# Patient Record
Sex: MALE | Race: OTHER | Employment: FULL TIME | URBAN - METROPOLITAN AREA
[De-identification: names, ages, dates, MRNs, and addresses within clinical notes are randomized per-mention and may not be internally consistent; named-entity substitution may affect disease eponyms.]

---

## 2017-10-31 PROBLEM — Z00.00 HEALTH CARE MAINTENANCE: Status: ACTIVE | Noted: 2017-10-31

## 2017-10-31 PROBLEM — Z13.29 ENCOUNTER FOR SCREENING FOR ENDOCRINE DISORDER: Status: ACTIVE | Noted: 2017-10-31

## 2017-10-31 PROBLEM — Z91.89: Status: ACTIVE | Noted: 2017-10-31

## 2017-10-31 PROBLEM — Z12.5 PROSTATE CANCER SCREENING: Status: ACTIVE | Noted: 2017-10-31

## 2017-10-31 PROBLEM — Z12.11 COLON CANCER SCREENING: Status: ACTIVE | Noted: 2017-10-31

## 2017-10-31 PROBLEM — Z91.89 AT RISK FOR OBSTRUCTIVE SLEEP APNEA: Status: ACTIVE | Noted: 2017-10-31

## 2017-10-31 PROBLEM — Z12.71 SCREENING FOR TESTICULAR CANCER: Status: ACTIVE | Noted: 2017-10-31

## 2017-10-31 PROBLEM — K43.9 VENTRAL HERNIA WITHOUT OBSTRUCTION OR GANGRENE: Status: ACTIVE | Noted: 2017-10-31

## 2017-10-31 PROBLEM — F41.9 ANXIETY: Status: ACTIVE | Noted: 2017-10-31

## 2019-01-21 PROBLEM — R51.9 HEADACHE DISORDER: Status: ACTIVE | Noted: 2019-01-21

## 2019-01-21 PROBLEM — I10 ESSENTIAL HYPERTENSION: Status: ACTIVE | Noted: 2019-01-21

## 2019-08-21 PROBLEM — E11.65 TYPE 2 DIABETES MELLITUS WITH HYPERGLYCEMIA, WITHOUT LONG-TERM CURRENT USE OF INSULIN (HCC): Status: ACTIVE | Noted: 2019-08-21

## 2019-08-21 PROBLEM — R74.01 TRANSAMINASEMIA: Status: ACTIVE | Noted: 2019-08-21

## 2019-08-21 PROBLEM — R77.8 ELEVATED TOTAL PROTEIN: Status: ACTIVE | Noted: 2019-08-21

## 2019-08-21 PROBLEM — Z13.29 ENCOUNTER FOR SCREENING FOR ENDOCRINE DISORDER: Status: RESOLVED | Noted: 2017-10-31 | Resolved: 2019-08-21

## 2019-08-21 PROBLEM — Z91.89: Status: RESOLVED | Noted: 2017-10-31 | Resolved: 2019-08-21

## 2019-08-21 PROCEDURE — 86335 IMMUNFIX E-PHORSIS/URINE/CSF: CPT | Performed by: INTERNAL MEDICINE

## 2019-08-21 PROCEDURE — 86334 IMMUNOFIX E-PHORESIS SERUM: CPT | Performed by: INTERNAL MEDICINE

## 2019-08-21 PROCEDURE — 80074 ACUTE HEPATITIS PANEL: CPT | Performed by: INTERNAL MEDICINE

## 2019-08-21 PROCEDURE — 84165 PROTEIN E-PHORESIS SERUM: CPT | Performed by: INTERNAL MEDICINE

## 2019-08-21 PROCEDURE — 84156 ASSAY OF PROTEIN URINE: CPT | Performed by: INTERNAL MEDICINE

## 2019-08-21 PROCEDURE — 83883 ASSAY NEPHELOMETRY NOT SPEC: CPT | Performed by: INTERNAL MEDICINE

## 2019-08-30 PROBLEM — R74.8 ALKALINE PHOSPHATASE ELEVATION: Status: ACTIVE | Noted: 2019-08-30

## 2019-08-30 PROBLEM — R09.82 PND (POST-NASAL DRIP): Status: ACTIVE | Noted: 2019-08-30

## 2019-08-30 PROCEDURE — 84080 ASSAY ALKALINE PHOSPHATASES: CPT | Performed by: INTERNAL MEDICINE

## 2019-08-30 PROCEDURE — 83516 IMMUNOASSAY NONANTIBODY: CPT | Performed by: INTERNAL MEDICINE

## 2019-08-30 PROCEDURE — 82390 ASSAY OF CERULOPLASMIN: CPT | Performed by: INTERNAL MEDICINE

## 2019-08-30 PROCEDURE — 82103 ALPHA-1-ANTITRYPSIN TOTAL: CPT | Performed by: INTERNAL MEDICINE

## 2019-08-30 PROCEDURE — 84075 ASSAY ALKALINE PHOSPHATASE: CPT | Performed by: INTERNAL MEDICINE

## 2019-09-17 PROBLEM — Z01.818 PREOPERATIVE EXAMINATION: Status: ACTIVE | Noted: 2019-09-17

## 2019-09-17 PROBLEM — R74.8 ALKALINE PHOSPHATASE ELEVATION: Status: RESOLVED | Noted: 2019-08-30 | Resolved: 2019-09-17

## 2019-09-17 PROBLEM — K76.0 HEPATIC STEATOSIS: Status: ACTIVE | Noted: 2019-09-17

## 2019-09-17 PROBLEM — R77.8 ELEVATED TOTAL PROTEIN: Status: RESOLVED | Noted: 2019-08-21 | Resolved: 2019-09-17

## 2020-01-24 PROBLEM — Z01.818 PREOPERATIVE EXAMINATION: Status: RESOLVED | Noted: 2019-09-17 | Resolved: 2020-01-24

## 2020-01-24 PROBLEM — Z98.890 POSTOPERATIVE SCAR: Status: ACTIVE | Noted: 2020-01-24

## 2020-01-24 PROBLEM — L90.5 POSTOPERATIVE SCAR: Status: ACTIVE | Noted: 2020-01-24

## 2020-01-24 PROBLEM — E11.9 TYPE 2 DIABETES MELLITUS WITHOUT COMPLICATION, WITHOUT LONG-TERM CURRENT USE OF INSULIN (HCC): Status: ACTIVE | Noted: 2019-08-21

## 2020-01-24 PROBLEM — Z12.71 SCREENING FOR TESTICULAR CANCER: Status: RESOLVED | Noted: 2017-10-31 | Resolved: 2020-01-24

## 2020-01-24 PROBLEM — H54.40 BLINDNESS OF LEFT EYE: Status: ACTIVE | Noted: 2020-01-24

## 2020-01-24 PROBLEM — F41.0 PANIC ATTACK: Status: ACTIVE | Noted: 2020-01-24

## 2020-01-24 PROBLEM — F41.0 PANIC ATTACK: Status: RESOLVED | Noted: 2020-01-24 | Resolved: 2020-01-24

## 2020-01-24 PROBLEM — Z00.00 HEALTH CARE MAINTENANCE: Status: RESOLVED | Noted: 2017-10-31 | Resolved: 2020-01-24

## 2020-01-24 PROBLEM — E11.9 DIABETES MELLITUS (HCC): Status: ACTIVE | Noted: 2020-01-24

## 2020-03-27 PROBLEM — Z87.19 H/O: UGI BLEED: Status: ACTIVE | Noted: 2020-03-27

## 2020-03-27 PROBLEM — F10.10 ALCOHOL ABUSE: Status: ACTIVE | Noted: 2020-03-27

## 2024-12-27 PROCEDURE — 99285 EMERGENCY DEPT VISIT HI MDM: CPT

## 2024-12-27 PROCEDURE — 99284 EMERGENCY DEPT VISIT MOD MDM: CPT

## 2024-12-28 ENCOUNTER — HOSPITAL ENCOUNTER (EMERGENCY)
Age: 39
Discharge: HOME | End: 2024-12-28
Attending: EMERGENCY MEDICINE
Payer: COMMERCIAL

## 2024-12-28 VITALS
WEIGHT: 220 LBS | TEMPERATURE: 98 F | RESPIRATION RATE: 17 BRPM | HEIGHT: 72 IN | DIASTOLIC BLOOD PRESSURE: 85 MMHG | BODY MASS INDEX: 29.8 KG/M2 | SYSTOLIC BLOOD PRESSURE: 113 MMHG | HEART RATE: 83 BPM | OXYGEN SATURATION: 99 %

## 2024-12-28 DIAGNOSIS — R11.2 NAUSEA AND VOMITING, UNSPECIFIED VOMITING TYPE: ICD-10-CM

## 2024-12-28 DIAGNOSIS — R39.89 URINE DISCOLORATION: Primary | ICD-10-CM

## 2024-12-28 DIAGNOSIS — B34.9 VIRAL SYNDROME: ICD-10-CM

## 2024-12-28 LAB
BILIRUB UR QL CFM: NEGATIVE
CLARITY UR REFRACT.AUTO: CLEAR
COLOR UR AUTO: YELLOW
GLUCOSE UR STRIP.AUTO-MCNC: 250 MG/DL
KETONES UR STRIP.AUTO-MCNC: 40 MG/DL
LEUKOCYTE ESTERASE UR QL STRIP.AUTO: NEGATIVE
NITRITE UR QL STRIP.AUTO: NEGATIVE
PH UR STRIP.AUTO: 7.5 [PH] (ref 5–8)
RBC UR QL AUTO: NEGATIVE
SP GR UR STRIP.AUTO: 1.01 (ref 1–1.03)
UROBILINOGEN UR STRIP.AUTO-MCNC: 4 MG/DL

## 2024-12-28 PROCEDURE — 81001 URINALYSIS AUTO W/SCOPE: CPT

## 2024-12-28 PROCEDURE — 81001 URINALYSIS AUTO W/SCOPE: CPT | Performed by: EMERGENCY MEDICINE

## 2024-12-28 PROCEDURE — 81015 MICROSCOPIC EXAM OF URINE: CPT

## 2024-12-28 PROCEDURE — S0119 ONDANSETRON 4 MG: HCPCS | Performed by: EMERGENCY MEDICINE

## 2024-12-28 RX ORDER — ONDANSETRON 4 MG/1
4 TABLET, ORALLY DISINTEGRATING ORAL ONCE
Status: COMPLETED | OUTPATIENT
Start: 2024-12-28 | End: 2024-12-28

## 2024-12-28 RX ORDER — ONDANSETRON 4 MG/1
4 TABLET, ORALLY DISINTEGRATING ORAL EVERY 4 HOURS PRN
Qty: 10 TABLET | Refills: 0 | Status: SHIPPED | OUTPATIENT
Start: 2024-12-28 | End: 2025-01-04

## 2024-12-28 NOTE — ED INITIAL ASSESSMENT (HPI)
Pt states for a few days his urine has been the color of cranberry juice. Feels fatigued. Vomited x 1.

## 2024-12-28 NOTE — ED PROVIDER NOTES
Patient Seen in: [facility] In [place]      History     Chief Complaint   Patient presents with    Urinary Symptoms     Stated Complaint: urinary sx    Subjective:   HPI      39-year-old male presents to the ER with urine discoloration.  The patient states he is notices for the past couple days he feels fatigued.  He did have an episode of vomiting.  He said he had been drinking alcohol recently.  She has been trying to eat and drink fluids but has had nausea and felt slightly ill to his intake has been decreased.  No fevers.  No sick contacts.    Objective:     Past Medical History:    Diabetes (HCC)    Essential hypertension              Past Surgical History:   Procedure Laterality Date    Hernia surgery  10/01/2019    Ventral incisional hernia repair - Karla Wyatt    Other surgical history  2008    Exploratory abd surgery                Social History     Socioeconomic History    Marital status: Single   Tobacco Use    Smoking status: Never    Smokeless tobacco: Never   Substance and Sexual Activity    Alcohol use: Yes     Comment: social     Drug use: No                  Physical Exam     ED Triage Vitals [12/28/24 0006]   BP (!) 128/91   Pulse 91   Resp 16   Temp 98 °F (36.7 °C)   Temp src    SpO2 96 %   O2 Device None (Room air)       Current Vitals:   Vital Signs  BP: 113/85  Pulse: 83  Resp: 17  Temp: 98 °F (36.7 °C)    Oxygen Therapy  SpO2: 99 %  O2 Device: None (Room air)        Physical Exam  General: This a pleasant nontoxic appearing patient in no apparent distress alert and oriented ×3  HEENT: Pupils are equal reactive to light.  Extra ocular motions are intact.  No scleral icterus or conjunctival pallor  Lungs: Clear to auscultation bilaterally.  No wheezes, rhonchi, or rales appreciated.  No accessory muscle use noted for breathing.  Cardiac: Regular rate and rhythm.  Normal S1 and 2 without murmurs or ectopy appreciated  Abdomen: Soft on examination without tenderness  to deep palpation or to percussion.  No masses appreciated.  Bowel sounds are normoactive.  No CVA tenderness.  Extremities: Moving all 4 without difficulty.  ED Course     Labs Reviewed   URINALYSIS, ROUTINE - Abnormal; Notable for the following components:       Result Value    Glucose Urine 250 (*)     Ketones Urine 40 (*)     Protein Urine 30 mg/dL (*)     Urobilinogen Urine 4.0 (*)     All other components within normal limits   UA MICROSCOPIC ONLY, URINE - Abnormal; Notable for the following components:    Bacteria Urine 2+ (*)     All other components within normal limits   ICTOTEST - Normal                   MDM    Differential diagnosis includes was not limited to urinary tract infection, dehydration, hyperbilirubinemia.  Patient's urinalysis had negative ictotest.  +2 bacteria but only 1-5 white blood cells.  Urine showed some ketones and some glucose protein was in the urine and some urobilinogen.  Negative nitrite and leukocyte esterase.  He received Zofran for nausea control.  He was able to tolerate some fluids.  He will need follow-up for his discolored urine.  There appears to be some urobilinogen but ictotest was negative.  This could be lysis of cells this could be related to some liver disease but he otherwise appears nontoxic.  Could be viral syndrome.  Encouraged to follow-up as an outpatient.  Continue workup with his primary care physician.  Return if symptoms progress  Note to Patient  The 21st Century Cures Act makes medical notes like these available to patients in the interest of transparency. However, be advised this is a medical document and is intended as [id number] communication; it is written in medical language and may appear blunt, direct, or contain abbreviations or verbiage that are unfamiliar. Medical documents are intended to carry relevant information, facts as evident, and the clinical opinion of the practitioner.  Patient was evaluated and a screening exam was performed.    As a treating physician attending to the patient, I determined, within reasonable clinical confidence and prior to discharge, that an emergency medical condition was not or was no longer present.  There was no indication for further evaluation, treatment or admission on an emergency basis.  Comprehensive verbal and written discharge and follow-up instructions were provided to help prevent relapse or worsening.  Patient was instructed to follow-up with their primary care provider for further evaluation and treatment, but to return immediately to the ER for worsening, concerning, new, changing or persisting symptoms.  I discussed the case with the patient and they had no questions, complaints, or concerns.  Patient felt comfortable going home.  ^^Please note that this report has been produced using speech recognition software and may contain errors related to that system including, but not limited to, errors in grammar, punctuation, and spelling, as well as words and phrases that possibly may have been recognized inappropriately.  If there are any questions or concerns, contact the dictating provider for clarification.  Medical Decision Making      Disposition and Plan     Clinical Impression:  1. Urine discoloration    2. Nausea and vomiting, unspecified vomiting type    3. Viral syndrome         Disposition:  Discharge  12/28/2024  5:32 am    Follow-up:  Mahad Myers MD    Schedule an appointment as soon as possible for a visit in 2 day(s)            Medications Prescribed:  Discharge Medication List as of 12/28/2024  5:41 AM        START taking these medications    Details   ondansetron 4 MG Oral Tablet Dispersible Take 1 tablet (4 mg total) by mouth every 4 (four) hours as needed for Nausea., Normal, Disp-10 tablet, R-0                 Supplementary Documentation:

## (undated) NOTE — LETTER
Date & Time: 12/28/2024, 5:42 AM  Patient: Nir Balbuena  Encounter Provider(s):    Dave Rios MD       To Whom It May Concern:    Nir Balbuena was seen and treated in our department on 12/27/2024. He should not return to work until 12/30/24 .    If you have any questions or concerns, please do not hesitate to call.        _____________________________  Physician/[facility] Signature